# Patient Record
Sex: MALE | Race: WHITE
[De-identification: names, ages, dates, MRNs, and addresses within clinical notes are randomized per-mention and may not be internally consistent; named-entity substitution may affect disease eponyms.]

---

## 2018-04-25 ENCOUNTER — HOSPITAL ENCOUNTER (EMERGENCY)
Dept: HOSPITAL 45 - C.EDB | Age: 48
Discharge: HOME | End: 2018-04-25
Payer: COMMERCIAL

## 2018-04-25 VITALS
BODY MASS INDEX: 30.62 KG/M2 | WEIGHT: 246.26 LBS | BODY MASS INDEX: 30.62 KG/M2 | HEIGHT: 75 IN | WEIGHT: 246.26 LBS | HEIGHT: 75 IN

## 2018-04-25 VITALS — SYSTOLIC BLOOD PRESSURE: 129 MMHG | DIASTOLIC BLOOD PRESSURE: 81 MMHG | OXYGEN SATURATION: 96 % | HEART RATE: 58 BPM

## 2018-04-25 VITALS — TEMPERATURE: 98.06 F

## 2018-04-25 DIAGNOSIS — R10.31: Primary | ICD-10-CM

## 2018-04-25 LAB
ALBUMIN SERPL-MCNC: 4 GM/DL (ref 3.4–5)
ALP SERPL-CCNC: 79 U/L (ref 45–117)
ALT SERPL-CCNC: 32 U/L (ref 12–78)
AST SERPL-CCNC: 17 U/L (ref 15–37)
BASOPHILS # BLD: 0.01 K/UL (ref 0–0.2)
BASOPHILS NFR BLD: 0.2 %
BUN SERPL-MCNC: 12 MG/DL (ref 7–18)
CALCIUM SERPL-MCNC: 9.4 MG/DL (ref 8.5–10.1)
CO2 SERPL-SCNC: 25 MMOL/L (ref 21–32)
CREAT SERPL-MCNC: 0.94 MG/DL (ref 0.6–1.4)
EOS ABS #: 0.07 K/UL (ref 0–0.5)
EOSINOPHIL NFR BLD AUTO: 158 K/UL (ref 130–400)
GLUCOSE SERPL-MCNC: 100 MG/DL (ref 70–99)
HCT VFR BLD CALC: 44.7 % (ref 42–52)
HGB BLD-MCNC: 15.3 G/DL (ref 14–18)
IG#: 0.01 K/UL (ref 0–0.02)
IMM GRANULOCYTES NFR BLD AUTO: 25.5 %
LIPASE: 128 U/L (ref 73–393)
LYMPHOCYTES # BLD: 1.32 K/UL (ref 1.2–3.4)
MCH RBC QN AUTO: 30.9 PG (ref 25–34)
MCHC RBC AUTO-ENTMCNC: 34.2 G/DL (ref 32–36)
MCV RBC AUTO: 90.3 FL (ref 80–100)
MONO ABS #: 0.25 K/UL (ref 0.11–0.59)
MONOCYTES NFR BLD: 4.8 %
NEUT ABS #: 3.52 K/UL (ref 1.4–6.5)
NEUTROPHILS # BLD AUTO: 1.4 %
NEUTROPHILS NFR BLD AUTO: 67.9 %
PMV BLD AUTO: 8.7 FL (ref 7.4–10.4)
POTASSIUM SERPL-SCNC: 3.7 MMOL/L (ref 3.5–5.1)
PROT SERPL-MCNC: 7.8 GM/DL (ref 6.4–8.2)
RED CELL DISTRIBUTION WIDTH CV: 12.9 % (ref 11.5–14.5)
RED CELL DISTRIBUTION WIDTH SD: 42.3 FL (ref 36.4–46.3)
SODIUM SERPL-SCNC: 137 MMOL/L (ref 136–145)
WBC # BLD AUTO: 5.18 K/UL (ref 4.8–10.8)

## 2018-04-25 NOTE — DIAGNOSTIC IMAGING REPORT
CT OF THE ABDOMEN AND PELVIS WITHOUT CONTRAST, STONE PROTOCOL



CLINICAL HISTORY: RLQ abd pain, right flank pain    



COMPARISON STUDY:  CT of the abdomen and pelvis March 25, 2016.



TECHNIQUE: Helical axial images of the abdomen and pelvis were obtained without

IV or oral contrast according to renal stone protocol.  A dose lowering

technique was utilized adhering to the principles of ALARA.



FINDINGS: A 3 mm right middle lobe nodule shown on image 12 of 488 is unchanged

since CT of March 25, 2016. This is benign given stability. The heart is mildly

enlarged. No renal, ureteral or bladder calculi are identified although

evaluation of the pelvis is compromised by streak artifact from right hip

arthroplasty. There is a calcified granulomas within the liver and spleen.

Unenhanced images of the adrenal glands and pancreas are normal. There is no

peripancreatic or pericholecystic infiltration. The appendix is normal. There is

no evidence for a bowel obstruction. There is no lymphadenopathy or ascites. No

suspicious osseous lesions are present.



IMPRESSION:  



1. No urinary calculi or hydronephrosis.



2. No acute process within the abdomen or pelvis on unenhanced exam. Normal

appendix. No bowel obstruction.



3. Mild cardiomegaly. 







Electronically signed by:  Ruperto Giles M.D.

4/25/2018 8:56 PM



Dictated Date/Time:  4/25/2018 8:48 PM

## 2018-04-26 NOTE — EMERGENCY ROOM VISIT NOTE
History


Report prepared by Toshia:  Kash Zaragoza


Under the Supervision of:  Dr. Alejandro Smith M.D.


First contact with patient:  20:07


Chief Complaint:  ABDOMINAL PAIN


Stated Complaint:  RT SIDE LOWER ABDOMEN PAIN


Nursing Triage Summary:  


Patient reports RLQ abdominal pain that began this morning with nausea. Denies 


vomiting or diarrhea. Denies urinary symptoms.





History of Present Illness


The patient is a 48 year old male who presents to the Emergency Room with 

complaints of intermittent abdominal pain since yesterday. He currently rates 

it an 8/10 in severity. He has a history of total hip replacement. He denies 

any abdominal surgery. Position does not change the pain. He has not taken any 

pain medication. He states that it was bothering him more last night, though it 

worsened today. He reports increased burping and loss of appetite. Pt denies LOC

, headache, fevers, chills, diaphoresis, visual changes, neck pain, chest pain, 

breathing difficulties, nausea, vomiting, back pain, melena, hematochezia, 

urinary symptoms, numbness, weakness, lymphadenopathy, rash, or other 

complaints.





   Source of History:  patient


   Onset:  since yesterday


   Position:  abdomen


   Symptom Intensity:  8/10


   Timing:  intermittent


Note:


Notes increased burping and loss of appetite.





Review of Systems


See HPI for pertinent positives and negatives.  A total of ten systems were 

reviewed and were otherwise negative.





Past Medical & Surgical


Medical Problems:


(1) Right Hip DJD


Surgical Problems:


(1) No significant past surgical history








Family History





FH: diabetes mellitus





Social History


Smoking Status:  Never Smoker


Alcohol Use:  none


Drug Use:  none


Housing Status:  lives with family


Occupation Status:  employed





Current/Historical Medications


No Active Prescriptions or Reported Meds





Allergies


Coded Allergies:  


     No Known Allergies (Unverified , 4/25/18)





Physical Exam


Vital Signs











  Date Time  Temp Pulse Resp B/P (MAP) Pulse Ox O2 Delivery O2 Flow Rate FiO2


 


4/25/18 22:58  58 18 129/81 96 Room Air  


 


4/25/18 21:39  60  131/75 97 Room Air  


 


4/25/18 19:16 36.7 65 18 164/80 95 Room Air  











Physical Exam


GENERAL: Awake, alert, uncomfortable-appearing, in no distress


HENT: Normocephalic, atraumatic. Oropharynx unremarkable.


EYES: Normal conjunctiva. Sclera non-icteric.


NECK: Supple. No nuchal rigidity. FROM. No masses.


RESPIRATORY: Clear to auscultation. No wheezes. No rales.  Normal respiratory 

effort.


CARDIAC: Normal rate.  Normal rhythm. No murmurs.  No rubs. Extremities warm 

and well perfused. Pulses equal.  No JVD.


GI: Soft, non-distended. RLQ tenderness to palpation. Rebound and guarding. No 

masses.


RECTAL: Deferred.


MUSCULOSKELETAL: Atraumatic. Chest examination reveals no tenderness. The back 

is symmetrical on inspection without obvious abnormality. There is no CVA 

tenderness to palpation. No joint edema. 


LOWER EXTREMITIES: Calves are equal size bilaterally and non-tender. No edema. 

No discoloration. 


NEURO: Normal sensorium. No sensory or motor deficits noted. 


SKIN: No rash or jaundice noted.





Medical Decision & Procedures


ER Provider


Diagnostic Interpretation:


Radiology results as stated below per my review and radiologist interpretation: 





CT OF THE ABDOMEN AND PELVIS WITHOUT CONTRAST, STONE PROTOCOL





CLINICAL HISTORY: RLQ abd pain, right flank pain    





COMPARISON STUDY:  CT of the abdomen and pelvis March 25, 2016.





TECHNIQUE: Helical axial images of the abdomen and pelvis were obtained without


IV or oral contrast according to renal stone protocol.  A dose lowering


technique was utilized adhering to the principles of ALARA.





FINDINGS: A 3 mm right middle lobe nodule shown on image 12 of 488 is unchanged


since CT of March 25, 2016. This is benign given stability. The heart is mildly


enlarged. No renal, ureteral or bladder calculi are identified although


evaluation of the pelvis is compromised by streak artifact from right hip


arthroplasty. There is a calcified granulomas within the liver and spleen.


Unenhanced images of the adrenal glands and pancreas are normal. There is no


peripancreatic or pericholecystic infiltration. The appendix is normal. There is


no evidence for a bowel obstruction. There is no lymphadenopathy or ascites. No


suspicious osseous lesions are present.





IMPRESSION:  





1. No urinary calculi or hydronephrosis.





2. No acute process within the abdomen or pelvis on unenhanced exam. Normal


appendix. No bowel obstruction.





3. Mild cardiomegaly. 











Electronically signed by:  Ruperto Giles M.D.


4/25/2018 8:56 PM





Dictated Date/Time:  4/25/2018 8:48 PM





Laboratory Results


4/25/18 20:20








Red Blood Count 4.95, Mean Corpuscular Volume 90.3, Mean Corpuscular Hemoglobin 

30.9, Mean Corpuscular Hemoglobin Concent 34.2, Mean Platelet Volume 8.7, 

Neutrophils (%) (Auto) 67.9, Lymphocytes (%) (Auto) 25.5, Monocytes (%) (Auto) 

4.8, Eosinophils (%) (Auto) 1.4, Basophils (%) (Auto) 0.2, Neutrophils # (Auto) 

3.52, Lymphocytes # (Auto) 1.32, Monocytes # (Auto) 0.25, Eosinophils # (Auto) 

0.07, Basophils # (Auto) 0.01





4/25/18 20:20

















Test


  4/25/18


20:15 4/25/18


20:20


 


Urine Color YELLOW  


 


Urine Appearance CLEAR (CLEAR)  


 


Urine pH 7.5 (4.5-7.5)  


 


Urine Specific Gravity


  1.023


(1.000-1.030) 


 


 


Urine Protein NEG (NEG)  


 


Urine Glucose (UA) NEG (NEG)  


 


Urine Ketones 2+ (NEG)  


 


Urine Occult Blood NEG (NEG)  


 


Urine Nitrite NEG (NEG)  


 


Urine Bilirubin NEG (NEG)  


 


Urine Urobilinogen NEG (NEG)  


 


Urine Leukocyte Esterase NEG (NEG)  


 


White Blood Count


  


  5.18 K/uL


(4.8-10.8)


 


Red Blood Count


  


  4.95 M/uL


(4.7-6.1)


 


Hemoglobin


  


  15.3 g/dL


(14.0-18.0)


 


Hematocrit  44.7 % (42-52) 


 


Mean Corpuscular Volume


  


  90.3 fL


()


 


Mean Corpuscular Hemoglobin


  


  30.9 pg


(25-34)


 


Mean Corpuscular Hemoglobin


Concent 


  34.2 g/dl


(32-36)


 


Platelet Count


  


  158 K/uL


(130-400)


 


Mean Platelet Volume


  


  8.7 fL


(7.4-10.4)


 


Neutrophils (%) (Auto)  67.9 % 


 


Lymphocytes (%) (Auto)  25.5 % 


 


Monocytes (%) (Auto)  4.8 % 


 


Eosinophils (%) (Auto)  1.4 % 


 


Basophils (%) (Auto)  0.2 % 


 


Neutrophils # (Auto)


  


  3.52 K/uL


(1.4-6.5)


 


Lymphocytes # (Auto)


  


  1.32 K/uL


(1.2-3.4)


 


Monocytes # (Auto)


  


  0.25 K/uL


(0.11-0.59)


 


Eosinophils # (Auto)


  


  0.07 K/uL


(0-0.5)


 


Basophils # (Auto)


  


  0.01 K/uL


(0-0.2)


 


RDW Standard Deviation


  


  42.3 fL


(36.4-46.3)


 


RDW Coefficient of Variation


  


  12.9 %


(11.5-14.5)


 


Immature Granulocyte % (Auto)  0.2 % 


 


Immature Granulocyte # (Auto)


  


  0.01 K/uL


(0.00-0.02)


 


Anion Gap


  


  6.0 mmol/L


(3-11)


 


Est Creatinine Clear Calc


Drug Dose 


  126.8 ml/min 


 


 


Estimated GFR (


American) 


  109.1 


 


 


Estimated GFR (Non-


American 


  94.2 


 


 


BUN/Creatinine Ratio  12.3 (10-20) 


 


Calcium Level


  


  9.4 mg/dl


(8.5-10.1)


 


Total Bilirubin


  


  0.8 mg/dl


(0.2-1)


 


Direct Bilirubin


  


  0.2 mg/dl


(0-0.2)


 


Aspartate Amino Transf


(AST/SGOT) 


  17 U/L (15-37) 


 


 


Alanine Aminotransferase


(ALT/SGPT) 


  32 U/L (12-78) 


 


 


Alkaline Phosphatase


  


  79 U/L


()


 


Total Protein


  


  7.8 gm/dl


(6.4-8.2)


 


Albumin


  


  4.0 gm/dl


(3.4-5.0)


 


Lipase


  


  128 U/L


()





Laboratory results reviewed by me





Medications Administered











 Medications


  (Trade)  Dose


 Ordered  Sig/Telma


 Route  Start Time


 Stop Time Status Last Admin


Dose Admin


 


 Oxycodone HCl


  (Roxicodone


 Immediate Rel 5MG


 Home Pack)  1 homepack  UD  ONCE


 PO  4/25/18 22:30


 4/25/18 22:31 DC 4/25/18 22:51


1 HOMEPACK











ED Course


2007: The patient was evaluated in room B6. A complete history and physical 

exam was performed.





2230: Ordered Oxycodone 1 homepack PO





2235: I reassessed the patient at this time. He still has pain, though it is 

not worsening. He agrees to come back to the ED if the pain worsens. He will 

follow up with his PCP. I discussed the results and treatment plan with the 

patient. I answered all pertaining questions that he had. He expressed 

understanding and verbalized agreement. The patient will be discharged home.





Medical Decision





Triage Nursing notes reviewed and agree them.








The patient's history was concerning for abdominal pain.  





Differential diagnosis:


Etiologies such as appendicitis, diverticulitis,  PUD, biliary pathology, UTI, 

pancreatitis, obstruction, mesenteric ischemia, aortic pathology, infections, 

inflammatory bowel disease, renal colic,  pathology, as well as others were 

entertained.  





Physical examination findings:


As above.  No testicular tenderness.  No masses noted.  No hernia.  No signs of 

zoster.





ER treatment provided:


Patient declined analgesia


On reassessment the patient felt some better.  No worsening of his abdominal 

examination.





Diagnostics interpreted by me:





The labs revealed an unremarkable CBC and chemistry panel.  Urinalysis negative.





Imaging studies:


CT scan as above





The patient is doing relatively well at this time.  He did not require any 

significant treatment in the emergency department.  His blood work was 

unremarkable.  His imaging was unremarkable as well.  There is no indication of 

appendicitis or emergent pathology.  The patient has tenderness in the right 

lower abdomen on examination.  I discussed conservative management and the 

patient feels very comfortable.  He was close to the hospital.  He will be 

given an oxycodone home pack for symptom control this evening.  If his pain 

persists or worsens in any way he will come back to the emergency department in 

the morning.  I advised him not to wait any longer than 8-12 hours.  He agrees.

  If he has persistent symptoms he may need a repeat CT scan, blood work, or 

general surgery consult.  He had a benign  examination.  There is no signs of 

infection on urinalysis.  No signs of kidney stone.


By the evaluation outlined above emergent etiologies such as appendicitis, 

diverticulitis,  PUD, biliary pathology, UTI, pancreatitis, obstruction, 

mesenteric ischemia, aortic pathology, infections, inflammatory bowel disease, 

renal colic,  as well as others were deemed relatively unlikely.  





The patient was informed about the findings as listed above.  All questions 

were answered and he was very pleased with the treatment.  Return instructions 

were outlined and the patient was discharged in stable condition.  





Outpatient prescription management:


Oxy IR 5mg 1-2 po Q4 hrs prn





Referral:


The patient was referred back to their primary care physician for follow-up for 

a recheck of the current condition.





Medication Reconcilliation


Current Medication List:  was personally reviewed by me





Blood Pressure Screening


Patient's blood pressure:  Elevated blood pressure


Blood pressure disposition:  Referred to PCP





Impression





 Primary Impression:  


 RLQ abdominal pain





Scribe Attestation


The scribe's documentation has been prepared under my direction and personally 

reviewed by me in its entirety. I confirm that the note above accurately 

reflects all work, treatment, procedures, and medical decision making performed 

by me.





Departure Information


Dispostion


Home / Self-Care





Prescriptions





No Active Prescriptions or Reported Meds





Referrals


No Doctor, Assigned (PCP)





Forms


Call Back Authorization, HOME CARE DOCUMENTATION FORM,                         

                                       IMPORTANT VISIT INFORMATION





Patient Instructions


My [facility]





Additional Instructions





ABDOMINAL PAIN INSTRUCTIONS:








Oxycodone (OxyIR) 5mg: Take 1-2 pills every four hours for breakthrough pain.  

Avoid alcohol, operating machinery or dangerous equipment, working on ladders 

or roofs, DRIVING, or situations where being under the influence may be 

dangerous.  





Rest and drink plenty of fluids as tolerated.  Slow sips of water or sports 

drinks are recommended instead of large amounts all at once.  





Continue current medications.





Once your stomach is settled start with a clear liquid diet (jello, soup broth, 

etc.) and then advance as tolerated.  You should avoid full, heavy meals for 

about 24 hrs from the time your symptoms resolved.  





Return to the emergency department in 8-12 hours for reevaluation or sooner if 

the pain worsens or does not resolve, vomiting occurs, black or bloody stools 

developed, testicular pain occurs, or you feel it necessary.








Follow up with your primary physician in 1-2 days for a recheck of your current 

condition.